# Patient Record
Sex: MALE | URBAN - METROPOLITAN AREA
[De-identification: names, ages, dates, MRNs, and addresses within clinical notes are randomized per-mention and may not be internally consistent; named-entity substitution may affect disease eponyms.]

---

## 2018-09-05 ENCOUNTER — IMPORTED ENCOUNTER (OUTPATIENT)
Dept: URBAN - METROPOLITAN AREA CLINIC 9 | Facility: CLINIC | Age: 58
End: 2018-09-05

## 2018-09-18 ENCOUNTER — IMPORTED ENCOUNTER (OUTPATIENT)
Dept: URBAN - METROPOLITAN AREA CLINIC 9 | Facility: CLINIC | Age: 58
End: 2018-09-18

## 2018-11-12 ENCOUNTER — IMPORTED ENCOUNTER (OUTPATIENT)
Dept: URBAN - METROPOLITAN AREA CLINIC 9 | Facility: CLINIC | Age: 58
End: 2018-11-12

## 2018-11-19 ENCOUNTER — IMPORTED ENCOUNTER (OUTPATIENT)
Dept: URBAN - METROPOLITAN AREA CLINIC 9 | Facility: CLINIC | Age: 58
End: 2018-11-19

## 2018-11-27 ENCOUNTER — IMPORTED ENCOUNTER (OUTPATIENT)
Dept: URBAN - METROPOLITAN AREA CLINIC 9 | Facility: CLINIC | Age: 58
End: 2018-11-27

## 2018-11-28 ENCOUNTER — IMPORTED ENCOUNTER (OUTPATIENT)
Dept: URBAN - METROPOLITAN AREA CLINIC 9 | Facility: CLINIC | Age: 58
End: 2018-11-28

## 2018-12-03 ENCOUNTER — IMPORTED ENCOUNTER (OUTPATIENT)
Dept: URBAN - METROPOLITAN AREA CLINIC 9 | Facility: CLINIC | Age: 58
End: 2018-12-03

## 2019-01-18 ENCOUNTER — IMPORTED ENCOUNTER (OUTPATIENT)
Dept: URBAN - METROPOLITAN AREA CLINIC 9 | Facility: CLINIC | Age: 59
End: 2019-01-18

## 2020-07-22 ENCOUNTER — INPATIENT (INPATIENT)
Facility: HOSPITAL | Age: 60
LOS: 0 days | Discharge: HOME | DRG: 65 | End: 2020-07-23
Attending: PSYCHIATRY & NEUROLOGY | Admitting: PSYCHIATRY & NEUROLOGY
Payer: MEDICAID

## 2020-07-22 VITALS
SYSTOLIC BLOOD PRESSURE: 224 MMHG | WEIGHT: 194.67 LBS | RESPIRATION RATE: 16 BRPM | OXYGEN SATURATION: 98 % | HEIGHT: 67 IN | HEART RATE: 49 BPM | DIASTOLIC BLOOD PRESSURE: 106 MMHG

## 2020-07-22 DIAGNOSIS — I63.9 CEREBRAL INFARCTION, UNSPECIFIED: ICD-10-CM

## 2020-07-22 DIAGNOSIS — R63.8 OTHER SYMPTOMS AND SIGNS CONCERNING FOOD AND FLUID INTAKE: ICD-10-CM

## 2020-07-22 DIAGNOSIS — J45.909 UNSPECIFIED ASTHMA, UNCOMPLICATED: ICD-10-CM

## 2020-07-22 DIAGNOSIS — I10 ESSENTIAL (PRIMARY) HYPERTENSION: ICD-10-CM
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
## 2020-07-22 LAB
ALBUMIN SERPL ELPH-MCNC: 4.5 G/DL — SIGNIFICANT CHANGE UP (ref 3.3–5)
ALP SERPL-CCNC: 62 U/L — SIGNIFICANT CHANGE UP (ref 40–120)
ALT FLD-CCNC: 13 U/L — SIGNIFICANT CHANGE UP (ref 10–45)
ANION GAP SERPL CALC-SCNC: 14 MMOL/L — SIGNIFICANT CHANGE UP (ref 5–17)
APPEARANCE UR: CLEAR — SIGNIFICANT CHANGE UP
AST SERPL-CCNC: 20 U/L — SIGNIFICANT CHANGE UP (ref 10–40)
BILIRUB SERPL-MCNC: 0.4 MG/DL — SIGNIFICANT CHANGE UP (ref 0.2–1.2)
BILIRUB UR-MCNC: NEGATIVE — SIGNIFICANT CHANGE UP
BUN SERPL-MCNC: 9 MG/DL — SIGNIFICANT CHANGE UP (ref 7–23)
CALCIUM SERPL-MCNC: 9.7 MG/DL — SIGNIFICANT CHANGE UP (ref 8.4–10.5)
CHLORIDE SERPL-SCNC: 101 MMOL/L — SIGNIFICANT CHANGE UP (ref 96–108)
CHOLEST SERPL-MCNC: 206 MG/DL — HIGH (ref 10–199)
CO2 SERPL-SCNC: 23 MMOL/L — SIGNIFICANT CHANGE UP (ref 22–31)
COLOR SPEC: YELLOW — SIGNIFICANT CHANGE UP
CREAT ?TM UR-MCNC: 134 MG/DL — SIGNIFICANT CHANGE UP
CREAT SERPL-MCNC: 1.28 MG/DL — SIGNIFICANT CHANGE UP (ref 0.5–1.3)
DIFF PNL FLD: NEGATIVE — SIGNIFICANT CHANGE UP
GLUCOSE SERPL-MCNC: 84 MG/DL — SIGNIFICANT CHANGE UP (ref 70–99)
GLUCOSE UR QL: NEGATIVE — SIGNIFICANT CHANGE UP
HCT VFR BLD CALC: 41.1 % — SIGNIFICANT CHANGE UP (ref 39–50)
HDLC SERPL-MCNC: 45 MG/DL — SIGNIFICANT CHANGE UP
HGB BLD-MCNC: 12.7 G/DL — LOW (ref 13–17)
KETONES UR-MCNC: NEGATIVE — SIGNIFICANT CHANGE UP
LEUKOCYTE ESTERASE UR-ACNC: NEGATIVE — SIGNIFICANT CHANGE UP
LIPID PNL WITH DIRECT LDL SERPL: 141 MG/DL — HIGH
MAGNESIUM SERPL-MCNC: 2.3 MG/DL — SIGNIFICANT CHANGE UP (ref 1.6–2.6)
MCHC RBC-ENTMCNC: 22.4 PG — LOW (ref 27–34)
MCHC RBC-ENTMCNC: 30.9 GM/DL — LOW (ref 32–36)
MCV RBC AUTO: 72.6 FL — LOW (ref 80–100)
NITRITE UR-MCNC: NEGATIVE — SIGNIFICANT CHANGE UP
NRBC # BLD: 0 /100 WBCS — SIGNIFICANT CHANGE UP (ref 0–0)
PH UR: 7 — SIGNIFICANT CHANGE UP (ref 5–8)
PHOSPHATE SERPL-MCNC: 2.8 MG/DL — SIGNIFICANT CHANGE UP (ref 2.5–4.5)
PLATELET # BLD AUTO: 292 K/UL — SIGNIFICANT CHANGE UP (ref 150–400)
POTASSIUM SERPL-MCNC: 4 MMOL/L — SIGNIFICANT CHANGE UP (ref 3.5–5.3)
POTASSIUM SERPL-SCNC: 4 MMOL/L — SIGNIFICANT CHANGE UP (ref 3.5–5.3)
PROT ?TM UR-MCNC: 16 MG/DL — HIGH (ref 0–12)
PROT SERPL-MCNC: 7.6 G/DL — SIGNIFICANT CHANGE UP (ref 6–8.3)
PROT UR-MCNC: NEGATIVE MG/DL — SIGNIFICANT CHANGE UP
PROT/CREAT UR-RTO: 0.1 RATIO — SIGNIFICANT CHANGE UP (ref 0–0.2)
RBC # BLD: 5.66 M/UL — SIGNIFICANT CHANGE UP (ref 4.2–5.8)
RBC # FLD: 16.6 % — HIGH (ref 10.3–14.5)
SODIUM SERPL-SCNC: 138 MMOL/L — SIGNIFICANT CHANGE UP (ref 135–145)
SP GR SPEC: 1.01 — SIGNIFICANT CHANGE UP (ref 1–1.03)
TOTAL CHOLESTEROL/HDL RATIO MEASUREMENT: 4.6 RATIO — SIGNIFICANT CHANGE UP (ref 3.4–9.6)
TRIGL SERPL-MCNC: 101 MG/DL — SIGNIFICANT CHANGE UP (ref 10–149)
UROBILINOGEN FLD QL: 0.2 E.U./DL — SIGNIFICANT CHANGE UP
WBC # BLD: 9.75 K/UL — SIGNIFICANT CHANGE UP (ref 3.8–10.5)
WBC # FLD AUTO: 9.75 K/UL — SIGNIFICANT CHANGE UP (ref 3.8–10.5)

## 2020-07-22 PROCEDURE — 99223 1ST HOSP IP/OBS HIGH 75: CPT

## 2020-07-22 RX ORDER — SODIUM CHLORIDE 9 MG/ML
1000 INJECTION INTRAMUSCULAR; INTRAVENOUS; SUBCUTANEOUS ONCE
Refills: 0 | Status: COMPLETED | OUTPATIENT
Start: 2020-07-22 | End: 2020-07-22

## 2020-07-22 RX ORDER — ASPIRIN/CALCIUM CARB/MAGNESIUM 324 MG
81 TABLET ORAL DAILY
Refills: 0 | Status: DISCONTINUED | OUTPATIENT
Start: 2020-07-23 | End: 2020-07-23

## 2020-07-22 RX ORDER — ENOXAPARIN SODIUM 100 MG/ML
40 INJECTION SUBCUTANEOUS AT BEDTIME
Refills: 0 | Status: DISCONTINUED | OUTPATIENT
Start: 2020-07-22 | End: 2020-07-23

## 2020-07-22 RX ORDER — ATORVASTATIN CALCIUM 80 MG/1
80 TABLET, FILM COATED ORAL AT BEDTIME
Refills: 0 | Status: DISCONTINUED | OUTPATIENT
Start: 2020-07-22 | End: 2020-07-23

## 2020-07-22 RX ORDER — HYDRALAZINE HCL 50 MG
5 TABLET ORAL EVERY 4 HOURS
Refills: 0 | Status: DISCONTINUED | OUTPATIENT
Start: 2020-07-22 | End: 2020-07-23

## 2020-07-22 RX ORDER — CLOPIDOGREL BISULFATE 75 MG/1
75 TABLET, FILM COATED ORAL DAILY
Refills: 0 | Status: DISCONTINUED | OUTPATIENT
Start: 2020-07-22 | End: 2020-07-23

## 2020-07-22 RX ADMIN — ENOXAPARIN SODIUM 40 MILLIGRAM(S): 100 INJECTION SUBCUTANEOUS at 21:05

## 2020-07-22 RX ADMIN — SODIUM CHLORIDE 1000 MILLILITER(S): 9 INJECTION INTRAMUSCULAR; INTRAVENOUS; SUBCUTANEOUS at 21:05

## 2020-07-22 RX ADMIN — CLOPIDOGREL BISULFATE 75 MILLIGRAM(S): 75 TABLET, FILM COATED ORAL at 16:19

## 2020-07-22 RX ADMIN — Medication 5 MILLIGRAM(S): at 15:25

## 2020-07-22 RX ADMIN — ATORVASTATIN CALCIUM 80 MILLIGRAM(S): 80 TABLET, FILM COATED ORAL at 21:06

## 2020-07-22 NOTE — H&P ADULT - PROBLEM SELECTOR PLAN 1
Patient presented to [place] [facility] with nausea, vomiting x 3, dizziness for 2-3 hours, non specific diffuse abdominal discomfort, blurry vision, diffused dull headaches, and left side body weakness.  Imaging in [place]:  -CT w contrast significant for suspected obstruction of the posterior circulation and subsequent subacute bilateral cerebellar infarction.  -CT Head W/O contrast: No evidence of acute intracranial hemorrhage. Concerning for BL subacute cerebral infarction.  ASA 325mg and Amlodipine 2.5 mg was given and BP goal was 150-180systolic ,  diastolic at outside hospital.  -Hydralazine 5mg IV push for SBP>180  -Continue aspirin 81mg   -Start Plavix 75 mg daily  - Lipitor 80 mg daily  - Passed bedside Dysphagia  - Continue neuro check q4h  - f/u LISA result for 7/23  - No need for more imaging

## 2020-07-22 NOTE — H&P ADULT - NSHPLABSRESULTS_GEN_ALL_CORE
LABS:                   I/O SUMMARY:    07-22-20 @ 07:01  -  07-22-20 @ 17:19  --------------------------------------------------------  IN: 0 mL / OUT: 500 mL / NET: -500 mL

## 2020-07-22 NOTE — H&P ADULT - HISTORY OF PRESENT ILLNESS
HPI: 58 Yo poor historian male with significant HTN (unknown med compliance), unsure Hx of bronchial Asthma  and suspected baseline decreased cognitive disorder presented to the [facility] with persistent neurological symptoms that started a few hours before visit to the [place] [facility]. Upon arrival to the ED, patient reported nausea and dizziness for 2-3 hours, non specific diffuse abdominal discomfort and, diffused dull headaches. Patient denied blurry vision, confusion, chills, fever, cough or diaphoresis. Upon arrival to the ED, patient was noted agitated with slurred speech and left side body weakness. CT w contrast significant for suspected obstruction of the posterior circulation and subsequent subacute bilateral cerebellar infarction. Following Neuro recommendation, Asprini 325mg and Amlodipin 2.5 mg was given and BP goal was 150-180systolic ,  diastolic.    [place] ED labs: WBC 12.80, Hb 13.8. HTC 42, MCV 75.7, MCH 24.9, RDW 15.1, , Neutr 80.9, Lymph 10.1, Creatinine 1.49, Glucose 101, and Troponin I: <0.015.   Pt Blood Type: B Positive. T&S negative.   CT Head W/O contrast: No evidence of acute intracranial hemorrhage. Concerning for BL subacute cerebral infarction.  CXR done due to chest pain complaint. Unremarkable.                 Denies fevers, chills, cough, chest pain, dyspnea, nausea, headache, diarrhea, sick contactschange in urinary or bowel habits  In the ED:    - VS: Tmax: , HR:  , BP:  , RR: , O2: %     - Pertinent Labs:     - Imaging: CXR: CT: US: Cath: EKG:     - Treatment/interventions:     PMHx:   PSHx:  Meds: See med rec  Allergies:  Social: see below HPI: 60 Yo poor historian male with significant HTN (unknown med compliance), hx of Asthma and suspected baseline decreased cognitive disorder presented to the [facility] with persistent neurological symptoms that started a few hours before visit to the [place] ED (7/22). Patient reports his headache and blurry vision was started Friday (7/17), he was prescribed with HTN medication(unknown name) on Monday (7/20) and on Tuesday (7/21) he had nausea, vomiting x 3, dizziness for 2-3 hours, non specific diffuse abdominal discomfort, blurry vision, diffused dull headaches, and left side body weakness. He called 911 and he was transferred to [place] ED.  Patient denied chills, fever, cough or diaphoresis.     Upon arrival to the ED, patient was noted agitated with slurred speech and left side body weakness. CT w contrast significant for suspected obstruction of the posterior circulation and subsequent subacute bilateral cerebellar infarction. Following Neuro recommendation, ASA 325mg and Amlodipine 2.5 mg was given and BP goal was 150-180systolic ,  diastolic.    [place] ED labs: WBC 12.80, Hb 13.8. HTC 42, MCV 75.7, MCH 24.9, RDW 15.1, , Neutr 80.9, Lymph 10.1, Creatinine 1.49, Glucose 101, and Troponin I: <0.015.   Pt Blood Type: B Positive. T&S negative.   CT Head W/O contrast: No evidence of acute intracranial hemorrhage. Concerning for BL subacute cerebral infarction.  CXR done due to chest pain complaint, Unremarkable. We don't have EKG report from [place] ED.  He was transferred to [facility] for more neurology/stroke evaluation.

## 2020-07-22 NOTE — H&P ADULT - ATTENDING COMMENTS
Patient seen today with stroke team NEVIN Yepez and resident Dr. Gibbs.     I was physically present for the key portions of the evaluation and management (E/M) service provided.  I agree with the above history, physical, and plan with the following additions/modifications    New onset 5 days ago of ataxia, gait instability, dysarthria and dysphagia, worsened over that time, now came to medical attention today.  B/l cerebellar strokes, vasculature appears intact, read as normal on OSH imaging.   Target mild hypernatremia though at this time strokes are well demarcated without edema, risk for malignant edema is relatively low.  Will initiate full stroke workup, and PT/OT/speech therapy.     Maciej Gaines MD  Attending Neurointensivist

## 2020-07-22 NOTE — H&P ADULT - ASSESSMENT
60 Yo poor historian male with significant HTN (unknown med compliance), hx of Asthma and suspected baseline decreased cognitive disorder presented to the [facility] with persistent neurological symptoms that started a few hours before visit to the [place] ED (7/22). He was transferred to [facility] for more neurology/stroke evaluation.

## 2020-07-22 NOTE — H&P ADULT - NSHPPHYSICALEXAM_GEN_ALL_CORE
PHYSICAL EXAM:  GENERAL: Pt lying in bed comfortably in NAD  HEAD:  Atraumatic   EYES: EOMI, PERRL, conjunctiva and sclera clear  ENT: Moist mucous membranes  NECK: Supple, No JVD  CHEST/LUNG: Clear to auscultation bilaterally; No rales, rhonchi, wheezing or rubs. Unlabored respirations  HEART: Regular rate and rhythm; No murmurs, rubs, or gallops  ABDOMEN: Bowel sounds present; Soft, Nontender, Nondistended. No guarding or rigidity    EXTREMITIES:  2+ Peripheral Pulses, brisk capillary refill. No clubbing, cyanosis, or edema  NERVOUS SYSTEM:  Alert & Oriented X3, speech unclear (dysarthric). CN 2-12 intact.  Facial movements symmetrical, no facial droop, tongue protrusion midline. Left side strength 4/5. Right side strength  5/5 upper and lower extremities. BL Sensation intact.   MSK: Limited left arm ROM.   All other extremities moves spontaneously. PHYSICAL EXAM:  GENERAL: Pt lying in bed comfortably in NAD  HEAD:  Atraumatic   EYES: EOMI, PERRL, conjunctiva and sclera clear  ENT: Moist mucous membranes  NECK: Supple, No JVD  CHEST/LUNG: Clear to auscultation bilaterally; No rales, rhonchi, wheezing or rubs. Unlabored respirations  HEART: Regular rate and rhythm; No murmurs, rubs, or gallops  ABDOMEN: Bowel sounds present; Soft, Nontender, Nondistended. No guarding or rigidity    EXTREMITIES:  2+ Peripheral Pulses, brisk capillary refill. No clubbing, cyanosis, or edema  NERVOUS SYSTEM:  Alert & Oriented X3, speech unclear (dysarthric). CN 2-12 intact.  Facial movements symmetrical, no facial droop, tongue protrusion midline. + dysarthria.  Pt hears more on L side then R. Left side strength 4/5. Right side strength  5/5 upper and lower extremities. BL Sensation intact.   MSK: Limited left arm ROM.   All other extremities moves spontaneously.

## 2020-07-22 NOTE — H&P ADULT - PROBLEM SELECTOR PLAN 5
F: none. Bolus as needed to maintain BP  E: Replete PRN  N: DASH/Diet     Prophylaxis: Lovenox 40mg sq, scd

## 2020-07-22 NOTE — H&P ADULT - NSHPREVIEWOFSYSTEMS_GEN_ALL_CORE
REVIEW OF SYSTEMS:    CONSTITUTIONAL: No weakness, fevers or chills  EYES/ENT: No visual changes;  No vertigo or throat pain   NECK: No pain or stiffness  RESPIRATORY: No cough, wheezing, hemoptysis; No shortness of breath  CARDIOVASCULAR: No chest pain or palpitations  GASTROINTESTINAL: No abdominal or epigastric pain. No nausea, vomiting, or hematemesis; No diarrhea or constipation. No melena or hematochezia.  GENITOURINARY: No dysuria, frequency or hematuria  SKIN: No itching, rashes  NEUROLOGICAL: Denies headache.
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
## 2020-07-23 VITALS — SYSTOLIC BLOOD PRESSURE: 164 MMHG | DIASTOLIC BLOOD PRESSURE: 77 MMHG

## 2020-07-23 LAB
ANION GAP SERPL CALC-SCNC: 11 MMOL/L — SIGNIFICANT CHANGE UP (ref 5–17)
ANION GAP SERPL CALC-SCNC: 13 MMOL/L — SIGNIFICANT CHANGE UP (ref 5–17)
BUN SERPL-MCNC: 12 MG/DL — SIGNIFICANT CHANGE UP (ref 7–23)
BUN SERPL-MCNC: 14 MG/DL — SIGNIFICANT CHANGE UP (ref 7–23)
CALCIUM SERPL-MCNC: 9.3 MG/DL — SIGNIFICANT CHANGE UP (ref 8.4–10.5)
CALCIUM SERPL-MCNC: 9.5 MG/DL — SIGNIFICANT CHANGE UP (ref 8.4–10.5)
CHLORIDE SERPL-SCNC: 101 MMOL/L — SIGNIFICANT CHANGE UP (ref 96–108)
CHLORIDE SERPL-SCNC: 105 MMOL/L — SIGNIFICANT CHANGE UP (ref 96–108)
CO2 SERPL-SCNC: 21 MMOL/L — LOW (ref 22–31)
CO2 SERPL-SCNC: 25 MMOL/L — SIGNIFICANT CHANGE UP (ref 22–31)
CREAT SERPL-MCNC: 1.37 MG/DL — HIGH (ref 0.5–1.3)
CREAT SERPL-MCNC: 1.55 MG/DL — HIGH (ref 0.5–1.3)
GLUCOSE SERPL-MCNC: 100 MG/DL — HIGH (ref 70–99)
GLUCOSE SERPL-MCNC: 114 MG/DL — HIGH (ref 70–99)
HCT VFR BLD CALC: 41.5 % — SIGNIFICANT CHANGE UP (ref 39–50)
HGB BLD-MCNC: 13 G/DL — SIGNIFICANT CHANGE UP (ref 13–17)
MCHC RBC-ENTMCNC: 22.7 PG — LOW (ref 27–34)
MCHC RBC-ENTMCNC: 31.3 GM/DL — LOW (ref 32–36)
MCV RBC AUTO: 72.4 FL — LOW (ref 80–100)
NRBC # BLD: 0 /100 WBCS — SIGNIFICANT CHANGE UP (ref 0–0)
PLATELET # BLD AUTO: 278 K/UL — SIGNIFICANT CHANGE UP (ref 150–400)
POTASSIUM SERPL-MCNC: 4.1 MMOL/L — SIGNIFICANT CHANGE UP (ref 3.5–5.3)
POTASSIUM SERPL-MCNC: 4.4 MMOL/L — SIGNIFICANT CHANGE UP (ref 3.5–5.3)
POTASSIUM SERPL-SCNC: 4.1 MMOL/L — SIGNIFICANT CHANGE UP (ref 3.5–5.3)
POTASSIUM SERPL-SCNC: 4.4 MMOL/L — SIGNIFICANT CHANGE UP (ref 3.5–5.3)
RBC # BLD: 5.73 M/UL — SIGNIFICANT CHANGE UP (ref 4.2–5.8)
RBC # FLD: 17 % — HIGH (ref 10.3–14.5)
SARS-COV-2 RNA SPEC QL NAA+PROBE: SIGNIFICANT CHANGE UP
SODIUM SERPL-SCNC: 135 MMOL/L — SIGNIFICANT CHANGE UP (ref 135–145)
SODIUM SERPL-SCNC: 141 MMOL/L — SIGNIFICANT CHANGE UP (ref 135–145)
WBC # BLD: 7.92 K/UL — SIGNIFICANT CHANGE UP (ref 3.8–10.5)
WBC # FLD AUTO: 7.92 K/UL — SIGNIFICANT CHANGE UP (ref 3.8–10.5)

## 2020-07-23 PROCEDURE — 84100 ASSAY OF PHOSPHORUS: CPT

## 2020-07-23 PROCEDURE — 97162 PT EVAL MOD COMPLEX 30 MIN: CPT

## 2020-07-23 PROCEDURE — 99233 SBSQ HOSP IP/OBS HIGH 50: CPT

## 2020-07-23 PROCEDURE — 82570 ASSAY OF URINE CREATININE: CPT

## 2020-07-23 PROCEDURE — 83735 ASSAY OF MAGNESIUM: CPT

## 2020-07-23 PROCEDURE — 85027 COMPLETE CBC AUTOMATED: CPT

## 2020-07-23 PROCEDURE — 87635 SARS-COV-2 COVID-19 AMP PRB: CPT

## 2020-07-23 PROCEDURE — 99254 IP/OBS CNSLTJ NEW/EST MOD 60: CPT

## 2020-07-23 PROCEDURE — 80053 COMPREHEN METABOLIC PANEL: CPT

## 2020-07-23 PROCEDURE — 80048 BASIC METABOLIC PNL TOTAL CA: CPT

## 2020-07-23 PROCEDURE — 99223 1ST HOSP IP/OBS HIGH 75: CPT

## 2020-07-23 PROCEDURE — 81003 URINALYSIS AUTO W/O SCOPE: CPT

## 2020-07-23 PROCEDURE — 80061 LIPID PANEL: CPT

## 2020-07-23 PROCEDURE — 36415 COLL VENOUS BLD VENIPUNCTURE: CPT

## 2020-07-23 PROCEDURE — 84156 ASSAY OF PROTEIN URINE: CPT

## 2020-07-23 RX ORDER — AMLODIPINE BESYLATE 2.5 MG/1
5 TABLET ORAL DAILY
Refills: 0 | Status: DISCONTINUED | OUTPATIENT
Start: 2020-07-23 | End: 2020-07-23

## 2020-07-23 RX ORDER — ATORVASTATIN CALCIUM 80 MG/1
1 TABLET, FILM COATED ORAL
Qty: 30 | Refills: 0
Start: 2020-07-23 | End: 2020-08-21

## 2020-07-23 RX ORDER — AMLODIPINE BESYLATE 2.5 MG/1
1 TABLET ORAL
Qty: 30 | Refills: 0
Start: 2020-07-23 | End: 2020-08-21

## 2020-07-23 RX ORDER — ASPIRIN/CALCIUM CARB/MAGNESIUM 324 MG
1 TABLET ORAL
Qty: 30 | Refills: 0
Start: 2020-07-23 | End: 2020-08-21

## 2020-07-23 RX ORDER — AMLODIPINE BESYLATE 2.5 MG/1
1 TABLET ORAL
Qty: 0 | Refills: 0 | DISCHARGE
Start: 2020-07-23

## 2020-07-23 RX ORDER — CLOPIDOGREL BISULFATE 75 MG/1
1 TABLET, FILM COATED ORAL
Qty: 30 | Refills: 0
Start: 2020-07-23 | End: 2020-08-21

## 2020-07-23 RX ORDER — SODIUM CHLORIDE 9 MG/ML
1000 INJECTION INTRAMUSCULAR; INTRAVENOUS; SUBCUTANEOUS ONCE
Refills: 0 | Status: COMPLETED | OUTPATIENT
Start: 2020-07-23 | End: 2020-07-23

## 2020-07-23 RX ADMIN — Medication 81 MILLIGRAM(S): at 11:33

## 2020-07-23 RX ADMIN — AMLODIPINE BESYLATE 5 MILLIGRAM(S): 2.5 TABLET ORAL at 16:10

## 2020-07-23 RX ADMIN — CLOPIDOGREL BISULFATE 75 MILLIGRAM(S): 75 TABLET, FILM COATED ORAL at 11:33

## 2020-07-23 RX ADMIN — Medication 5 MILLIGRAM(S): at 16:10

## 2020-07-23 NOTE — DISCHARGE NOTE NURSING/CASE MANAGEMENT/SOCIAL WORK - NSDCFUADDAPPT_GEN_ALL_CORE_FT
Please schedule an appointment with Dr. Dane Tadeo or any Neurologist at [facility] upon discharge. It is imperative if you close follow up for your extremely high blood pressure and recent stroke.     [phone]

## 2020-07-23 NOTE — DISCHARGE NOTE PROVIDER - NSDCQMSTROKERISK_NEU_ALL_CORE
High blood pressure/History of a stroke or TIA High blood pressure History of a stroke or TIA/High blood pressure

## 2020-07-23 NOTE — DISCHARGE NOTE PROVIDER - NSDCCPCAREPLAN_GEN_ALL_CORE_FT
PRINCIPAL DISCHARGE DIAGNOSIS  Diagnosis: Cerebrovascular accident (CVA)  Assessment and Plan of Treatment:         SECONDARY DISCHARGE DIAGNOSES  Diagnosis: HTN (hypertension)  Assessment and Plan of Treatment:     Diagnosis: Asthma  Assessment and Plan of Treatment:     Diagnosis: Nutrition, metabolism, and development symptoms  Assessment and Plan of Treatment: Nutrition, metabolism, and development symptoms PRINCIPAL DISCHARGE DIAGNOSIS  Diagnosis: Cerebrovascular accident (CVA)  Assessment and Plan of Treatment: You were noted to have a stroke, also known as a cerebrovascular accident (CVA). This was found based on your symptom profile, and findings noted on computed tomographical imaging (CT imaging) of your brain. Please continue to take aspirin and clopidogrel ___ as prescribed. Please follow-up with your primary care physician, and with your neurologist,  ____. Please continue to work with physical therapy if needed. Symptom improvement varies greatly, varying from minimal improvement to even possibly returning back to baseline with rehabilitation therapy. Should you start to experience symptoms such as but not limited to: changes in vision, changes in hearing, severe weakness/dizziness, fainting spells, or difficulties moving your eyelids or mouth, please return to the emergency department immediately for interval evaluation.  Please continue to take aspirin 81 mg daily, lipitor 80 mg daily        SECONDARY DISCHARGE DIAGNOSES  Diagnosis: HTN (hypertension)  Assessment and Plan of Treatment:     Diagnosis: Asthma  Assessment and Plan of Treatment:     Diagnosis: Nutrition, metabolism, and development symptoms  Assessment and Plan of Treatment: Nutrition, metabolism, and development symptoms PRINCIPAL DISCHARGE DIAGNOSIS  Diagnosis: Cerebrovascular accident (CVA)  Assessment and Plan of Treatment: You were noted to have a stroke, also known as a cerebrovascular accident (CVA). This was found based on your symptom profile, and findings noted on computed tomographical imaging (CT imaging) of your brain. Please continue to take aspirin 81mg and clopidogrel 75 mg as prescribed. Please follow-up with your primary care physician, and with your neurologist, Dr. Dr. Dane Tadeo Neurologist in [place]  [phone]. Please continue to work with physical therapy if needed. Symptom improvement varies greatly, varying from minimal improvement to even possibly returning back to baseline with rehabilitation therapy. Should you start to experience symptoms such as but not limited to: changes in vision, changes in hearing, severe weakness/dizziness, fainting spells, or difficulties moving your eyelids or mouth, please return to the emergency department immediately for interval evaluation.  Please continue to take aspirin 81 mg daily, lipitor 80 mg daily        SECONDARY DISCHARGE DIAGNOSES  Diagnosis: HTN (hypertension)  Assessment and Plan of Treatment: Hypertension is the medical term for high blood pressure. Blood pressure refers to the pressure that blood applies to the inner walls of the arteries. Arteries carry blood from the heart to other organs and parts of the body. Untreated high blood pressure increases the strain on the heart and arteries, eventually causing organ damage. High blood pressure increases the risk of heart failure, heart attack (myocardial infarction), stroke, and kidney failure. High blood pressure does not usually cause any symptoms. Treatment of hypertension usually begins with lifestyle changes. Making these lifestyle changes involves little or no risk. Recommended changes often include reducing the amount of salt in your diet, losing weight if you are overweight or obese, avoiding drinking too much alcohol, stopping smoking and exercising at least 30 minutes per day most days of the week. If you are prescribed medication for your hypertension it is important to take these as prescribed to prevent the possible complications of uncontrolled hypertension.  Please continue to take Amlodipine 5mg daily      Diagnosis: Asthma  Assessment and Plan of Treatment: Asthma is a condition that can make it hard to breathe. Asthma does not always cause symptoms. But when symptoms occur, they can be scary. Asthma attacks happen when the airways in the lungs become narrow and inflamed. Asthma symptoms can include wheezing or noisy breathing, coughing, a tight feeling in the chest and shortness of breath. You can help prevent your asthma symptoms by staying away from things that may cause your symptoms or make them worse. Some common triggers include dust, mold, pets, pollen, cigarette smoke, getting sick with a cold or flu and stress. It is important that you take your asthma medications as they are prescribe to prevent further exacerbations and help with symptoms during an asthma attack. You should see your doctor if you have an asthma attack and the symptoms do not improve or get worse after using a quick-relief medicine. If the symptoms are severe, call for an ambulance. PRINCIPAL DISCHARGE DIAGNOSIS  Diagnosis: Cerebrovascular accident (CVA)  Assessment and Plan of Treatment: You were noted to have a stroke, also known as a cerebrovascular accident (CVA). This was found based on your symptom profile, and findings noted on computed tomographical imaging (CT imaging) of your brain. Please continue to take aspirin 81mg and clopidogrel 75 mg as prescribed. Please follow-up with your primary care physician, and with your neurologist, Dr. Dr. Dane Tadeo Neurologist in [place]  [phone]. Please continue to work with physical therapy if needed. Symptom improvement varies greatly, varying from minimal improvement to even possibly returning back to baseline with rehabilitation therapy. Should you start to experience symptoms such as but not limited to: changes in vision, changes in hearing, severe weakness/dizziness, fainting spells, or difficulties moving your eyelids or mouth, please return to the emergency department immediately for interval evaluation.  Please continue to take aspirin 81 mg daily, lipitor 80 mg daily        SECONDARY DISCHARGE DIAGNOSES  Diagnosis: Acute kidney injury  Assessment and Plan of Treatment: Although we do not know your baseline kidney function, tests showed that your creatinine was elevated which is a sign of kidney injury. It is important to take your blood pressure medications as prescribed and drink plenty of clear liquids.  You should have this kidney function tests repeated as an outpatient.    Diagnosis: Asthma  Assessment and Plan of Treatment: Asthma is a condition that can make it hard to breathe. Asthma does not always cause symptoms. But when symptoms occur, they can be scary. Asthma attacks happen when the airways in the lungs become narrow and inflamed. Asthma symptoms can include wheezing or noisy breathing, coughing, a tight feeling in the chest and shortness of breath. You can help prevent your asthma symptoms by staying away from things that may cause your symptoms or make them worse. Some common triggers include dust, mold, pets, pollen, cigarette smoke, getting sick with a cold or flu and stress. It is important that you take your asthma medications as they are prescribe to prevent further exacerbations and help with symptoms during an asthma attack. You should see your doctor if you have an asthma attack and the symptoms do not improve or get worse after using a quick-relief medicine. If the symptoms are severe, call for an ambulance.      Diagnosis: HTN (hypertension)  Assessment and Plan of Treatment: Hypertension is the medical term for high blood pressure. Blood pressure refers to the pressure that blood applies to the inner walls of the arteries. Arteries carry blood from the heart to other organs and parts of the body. Untreated high blood pressure increases the strain on the heart and arteries, eventually causing organ damage. High blood pressure increases the risk of heart failure, heart attack (myocardial infarction), stroke, and kidney failure. High blood pressure does not usually cause any symptoms. Treatment of hypertension usually begins with lifestyle changes. Making these lifestyle changes involves little or no risk. Recommended changes often include reducing the amount of salt in your diet, losing weight if you are overweight or obese, avoiding drinking too much alcohol, stopping smoking and exercising at least 30 minutes per day most days of the week. If you are prescribed medication for your hypertension it is important to take these as prescribed to prevent the possible complications of uncontrolled hypertension.  Please continue to take Amlodipine 5mg daily

## 2020-07-23 NOTE — PROGRESS NOTE ADULT - ASSESSMENT
60 Yo poor historian male with significant HTN (unknown med compliance), hx of Asthma and suspected baseline decreased cognitive disorder presented to the [facility] with persistent neurological symptoms that started a few hours before visit to the [place] (7/22). He was transferred to [facility] for more neurology/stroke evaluation.

## 2020-07-23 NOTE — PHYSICAL THERAPY INITIAL EVALUATION ADULT - PERTINENT HX OF CURRENT PROBLEM, REHAB EVAL
58 Yo poor historian male with significant HTN (unknown med compliance), hx of Asthma and suspected baseline decreased cognitive disorder presented to the [facility] with persistent neurological symptoms that started a few hours before visit to the [facility] (7/22). He was transferred to [facility] for more neurology/stroke evaluation

## 2020-07-23 NOTE — PROGRESS NOTE ADULT - ATTENDING COMMENTS
Attending addendum:    Patient seen today with stroke resident and PA team.  I was physically present for the key portions of the evaluation and management (E/M) service provided.  I agree with the above history, physical, and plan with the following additions/modifications     Patient with prominent but stable ataxia and dysarthria.  Stroke mechanism highly likely 2/2 intracranial atherosclerosis in posterior circulation.   Ambulated on my exam and seen by PT. Continue DAPT for 3 weeks and then de-escalate to ASA only.  Pending echo to rule out cardioembolic source, if negative can follow up with outpatient neurology.    Maciej Gaines MD  Attending Neurointensivist

## 2020-07-23 NOTE — PHYSICAL THERAPY INITIAL EVALUATION ADULT - ADDITIONAL COMMENTS
Limited social hx 2/2 pt mildly agitated. pt states that he lives w/ his wife in an apartment. Denies use of DME for ambulation. Denies hx of recent falls

## 2020-07-23 NOTE — DISCHARGE NOTE PROVIDER - NSDCMRMEDTOKEN_GEN_ALL_CORE_FT
amLODIPine 5 mg oral tablet: 1 tab(s) orally once a day MDD:5 mg daily  aspirin 81 mg oral tablet, chewable: 1 tab(s) orally once a day MDD:81 mg daily  atorvastatin 80 mg oral tablet: 1 tab(s) orally once a day (at bedtime) MDD:80 mg daily  clopidogrel 75 mg oral tablet: 1 tab(s) orally once a day MDD:75 mg daily amLODIPine 5 mg oral tablet: 1 tab(s) orally once a day MDD:5 mg daily  amLODIPine 5 mg oral tablet: 1 tab(s) orally once a day  aspirin 81 mg oral tablet, chewable: 1 tab(s) orally once a day MDD:81 mg daily  atorvastatin 80 mg oral tablet: 1 tab(s) orally once a day (at bedtime) MDD:80 mg daily  clopidogrel 75 mg oral tablet: 1 tab(s) orally once a day MDD:75 mg daily

## 2020-07-23 NOTE — DISCHARGE NOTE PROVIDER - NSDCACTIVITY_GEN_ALL_CORE
No heavy lifting/straining/Return to Work/School allowed/Do not make important decisions/Do not drive or operate machinery

## 2020-07-23 NOTE — DISCHARGE NOTE PROVIDER - HOSPITAL COURSE
60 Yo poor historian male with significant HTN (unknown med compliance), hx of Asthma and suspected baseline decreased cognitive disorder presented to the [facility] with persistent neurological symptoms that started a few hours before visit to the [place] ED (7/22). Patient reports his headache and blurry vision was started Friday (7/17), he was prescribed with HTN medication(unknown name) on Monday (7/20) and on Tuesday (7/21) he had nausea, vomiting x 3, dizziness for 2-3 hours, non specific diffuse abdominal discomfort, blurry vision, diffused dull headaches, and left side body weakness. He called 911 and he was transferred to [place] ED.  Patient denied chills, fever, cough or diaphoresis.         Upon arrival to the ED, patient was noted agitated with slurred speech and left side body weakness. CT w contrast significant for suspected obstruction of the posterior circulation and subsequent subacute bilateral cerebellar infarction. Following Neuro recommendation, ASA 325mg and Amlodipine 2.5 mg was given and BP goal was 150-180systolic ,  diastolic.      [place] ED labs: WBC 12.80, Hb 13.8. HTC 42, MCV 75.7, MCH 24.9, RDW 15.1, , Neutr 80.9, Lymph 10.1, Creatinine 1.49, Glucose 101, and Troponin I: <0.015. Pt Blood Type: B Positive. T&S negative.     CXR done due to chest pain complaint, Unremarkable. We don't have EKG report from [place] ED.    Pt also endorsing tinnitus since Friday (5 days ago, at onset of symptoms)                            Patient is __ yo M/F with past medical history of _____    Presented with _____, found to have _____    Problem List/Main Diagnoses (system-based):     Inpatient treatment course:     New medications:     Labs to be followed outpatient:     Exam to be followed outpatient: 58 Yo poor historian male with significant HTN (unknown med compliance), hx of Asthma and suspected baseline decreased cognitive disorder presented to the [facility] with persistent neurological symptoms that started a few hours before visit to the [place] ED (7/22). Patient reports his headache and blurry vision was started Friday (7/17), he was prescribed with HTN medication(unknown name) on Monday (7/20) and on Tuesday (7/21) he had nausea, vomiting x 3, dizziness for 2-3 hours, non specific diffuse abdominal discomfort, blurry vision, diffused dull headaches, and left side body weakness. He called 911 and he was transferred to [place] ED.  Patient denied chills, fever, cough or diaphoresis. Pt also endorsing tinnitus since Friday (5 days ago, at onset of symptoms)    Upon arrival to the [place] ED, patient was noted agitated with slurred speech and left side body weakness. CT w contrast significant for suspected obstruction of the posterior circulation and subsequent subacute bilateral cerebellar infarction. Following Neuro recommendation, ASA 325mg and Amlodipine 2.5 mg was given and BP goal was 150-180systolic ,  diastolic.         Patient was transferred to [facility] on 7/22/2020 for more evaluation. In [facility], 7/22/2020 he had significant + pt with significant dysmetria on Left finger-to-nose and Left heel-to-shin    and + dysarthria.                Problems, Plan:    1.CVA (cerebral vascular accident).      Patient presented to [place] [facility] with nausea, vomiting x 3, dizziness for 2-3 hours, non specific diffuse abdominal discomfort, blurry vision, diffused dull headaches, and left side body weakness.    Imaging in [place]:    -CT w contrast significant for suspected obstruction of the posterior circulation and subsequent subacute bilateral cerebellar infarction.    -CT Head W/O contrast: No evidence of acute intracranial hemorrhage. Concerning for BL subacute cerebral infarction.    ASA 325mg and Amlodipine 2.5 mg was given and BP goal was 150-180systolic ,  diastolic at outside hospital.    Meds at [facility]:    -Hydralazine 5mg IV push for SBP>180    -Continue aspirin 81mg     -Start Plavix 75 mg daily    - Lipitor 80 mg daily    - Passed bedside Dysphagia    - Continue neuro check q4h    - LISA done 7/23        2.HTN (hypertension).     -Permissive HTN, Systolic 140-180    -Cr:1.55 (7/23),     - Amlodipine 5mg oral started        3. R/O Acute kidney injury.      creatinine : 1.49, 1.55, However unknown baseline. Daily BMP monitored.        4. Asthma.     - Pt reports hx of asthma.     - Dounebs PRN for wheezing and SOB.         5. Nutrition, metabolism, and development symptoms.    F: none. Bolus as needed to maintain BP    E: Replete PRN    N: DASH/Diet     D: Lovenox 40mg sq, scd.                                            Inpatient treatment course:     New medications:     Labs to be followed outpatient:     Exam to be followed outpatient: 60 Yo poor historian male with significant HTN (unknown med compliance), hx of Asthma and suspected baseline decreased cognitive disorder presented to the [facility] with persistent neurological symptoms that started a few hours before visit to the [place] ED (7/22). Patient reports his headache and blurry vision was started Friday (7/17), he was prescribed with HTN medication(unknown name) on Monday (7/20) and on Tuesday (7/21) he had nausea, vomiting x 3, dizziness for 2-3 hours, non specific diffuse abdominal discomfort, blurry vision, diffused dull headaches, and left side body weakness. He called 911 and he was transferred to [place] ED.  Patient denied chills, fever, cough or diaphoresis. Pt also endorsing tinnitus since Friday (5 days ago, at onset of symptoms)    Upon arrival to the [place] ED, patient was noted agitated with slurred speech and left side body weakness. CT w contrast significant for suspected obstruction of the posterior circulation and subsequent subacute bilateral cerebellar infarction. Following Neuro recommendation, ASA 325mg and Amlodipine 2.5 mg was given and BP goal was 150-180systolic ,  diastolic.         Patient was transferred to [facility] on 7/22/2020 for more evaluation. In [facility], 7/22/2020 he had significant + pt with significant dysmetria on Left finger-to-nose and Left heel-to-shin    and + dysarthria.                Problems, Plan:    1.CVA (cerebral vascular accident).      Patient presented to [place] [facility] with nausea, vomiting x 3, dizziness for 2-3 hours, non specific diffuse abdominal discomfort, blurry vision, diffused dull headaches, and left side body weakness.    Imaging in [place]:    -CT w contrast significant for suspected obstruction of the posterior circulation and subsequent subacute bilateral cerebellar infarction.    -CT Head W/O contrast: No evidence of acute intracranial hemorrhage. Concerning for BL subacute cerebral infarction.    ASA 325mg and Amlodipine 2.5 mg was given and BP goal was 150-180systolic ,  diastolic at outside hospital.    Meds at [facility]:    -Hydralazine 5mg IV push for SBP>180    -Continue aspirin 81mg     -Start Plavix 75 mg daily    - Lipitor 80 mg daily    - Passed bedside Dysphagia    - Continue neuro check q4h    - LISA done 7/23..................................        2.HTN (hypertension).     -Permissive HTN, Systolic 140-180    -Cr:1.55 (7/23),         3. R/O Acute kidney injury.      creatinine : 1.49, 1.55, However unknown baseline. Daily BMP monitored.        4. Asthma.     - Pt reports hx of asthma.     - Dounebs PRN for wheezing and SOB.                 Inpatient treatment course:             New medications:     Aspirin 81 mg,     Plavix 75 mg,    Amlodipine 5 mg (Creatinine: 1.37, 1.55)        Labs to be followed outpatient:     CMP(started statin on 7/22/2020)        Exam to be followed outpatient:     Neurologist follow up and Neurology exams. 60 Yo poor historian male with significant HTN (unknown med compliance), hx of Asthma and suspected baseline decreased cognitive disorder presented to the [facility] with persistent neurological symptoms that started a few hours before visit to the [place] [facility] (7/22). Patient reports his headache and blurry vision was started Friday (7/17), he was prescribed with HTN medication(unknown name) on Monday (7/20) and on Tuesday (7/21) he had nausea, vomiting x 3, dizziness for 2-3 hours, non specific diffuse abdominal discomfort, blurry vision, diffused dull headaches, and left side body weakness. He called 911 and he was transferred to [place] ED.  Patient denied chills, fever, cough or diaphoresis. Pt also endorsing tinnitus since Friday (5 days ago, at onset of symptoms)    Upon arrival to the [place] ED, patient was noted agitated with slurred speech and left side body weakness. CT w contrast significant for suspected obstruction of the posterior circulation and subsequent subacute bilateral cerebellar infarction. Following Neuro recommendation, ASA 325mg and Amlodipine 2.5 mg was given and BP goal was 150-180systolic ,  diastolic.         Patient was transferred to [facility] on 7/22/2020 for more evaluation. In [facility], 7/22/2020 he had significant + pt with significant dysmetria on Left finger-to-nose and Left heel-to-shin    and + dysarthria.                Problems, Plan:    1.CVA (cerebral vascular accident).      Patient presented to [place] [facility] with nausea, vomiting x 3, dizziness for 2-3 hours, non specific diffuse abdominal discomfort, blurry vision, diffused dull headaches, and left side body weakness.    Imaging in [place]:    -CT w contrast significant for suspected obstruction of the posterior circulation and subsequent subacute bilateral cerebellar infarction.    -CT Head W/O contrast: No evidence of acute intracranial hemorrhage. Concerning for BL subacute cerebral infarction.    ASA 325mg and Amlodipine 2.5 mg was given and BP goal was 150-180systolic ,  diastolic at outside hospital.    Meds at [facility]:    -Hydralazine 5mg IV push for SBP>180    -Continue aspirin 81mg     -Start Plavix 75 mg daily    - Lipitor 80 mg daily    - Passed bedside Dysphagia    - Continue neuro check q4h    - LISA done 7/23..................................        2.HTN (hypertension).     -Permissive HTN, Systolic 140-180    -Cr:1.55 (7/23),         3. R/O Acute kidney injury.      creatinine : 1.49, 1.55, However unknown baseline. Daily BMP monitored.        4. Asthma.     - Pt reports hx of asthma.     - Dounebs PRN for wheezing and SOB.                 Inpatient treatment course:     Patient was transferred to [facility] on 7/22/2020 for more evaluation after imaging showed . In [facility], 7/22/2020 he had significant + pt with significant dysmetria on         New medications:     Aspirin 81 mg daily    Plavix 75 mg daily    Amlodipine 5 mg daily        Labs to be followed outpatient:     CMP(started statin on 7/22/2020)        Exam to be followed outpatient:     Neurologist follow-up and Neurology exams. 60 Yo poor historian male with significant HTN (unknown med compliance), hx of Asthma and suspected baseline decreased cognitive disorder presented to the [facility] with persistent neurological symptoms that started a few hours before visit to the [place] ED (7/22). Patient reports his headache and blurry vision was started Friday (7/17), he was prescribed with HTN medication(unknown name) on Monday (7/20) and on Tuesday (7/21) he had nausea, vomiting x 3, dizziness for 2-3 hours, non specific diffuse abdominal discomfort, blurry vision, diffused dull headaches, and left side body weakness. He called 911 and he was transferred to [place] ED.  Patient denied chills, fever, cough or diaphoresis. Pt also endorsing tinnitus since Friday (5 days ago, at onset of symptoms)    Upon arrival to the [place] ED, patient was noted agitated with slurred speech and left side body weakness. CT w contrast significant for suspected obstruction of the posterior circulation and subsequent subacute bilateral cerebellar infarction. Following Neuro recommendation, ASA 325mg and Amlodipine 2.5 mg was given and BP goal was 150-180systolic ,  diastolic.         Patient was transferred to [facility] on 7/22/2020 for more evaluation. In [facility], 7/22/2020 he had significant + pt with significant dysmetria on Left finger-to-nose and Left heel-to-shin    and + dysarthria.                Problems, Plan:    #CVA     Patient presented to [place] [facility] with nausea, vomiting x 3, dizziness for 2-3 hours, non specific diffuse abdominal discomfort, blurry vision, diffused dull headaches, and left side body weakness.    Imaging at [place]:    -CT w contrast significant for suspected obstruction of the posterior circulation and subsequent subacute bilateral cerebellar infarction.    -CT Head W/O contrast: No evidence of acute intracranial hemorrhage. Concerning for BL subacute cerebral infarction.    ASA 325mg and Amlodipine 2.5 mg was given and BP goal was 150-180systolic ,  diastolic at outside hospital.    Meds at [facility]:    -Hydralazine 5mg IV push for SBP>180    -Continue aspirin 81mg     -Start Plavix 75 mg daily    - Lipitor 80 mg daily    - Passed bedside Dysphagia    - Continue neuro check q4h    - LISA cancelled due to HTN        #HTN (hypertension).     -Permissive HTN, Systolic 140-180    -Discharged on amlodipine 5 mg. Needs close f/u with Neurology or PCP        #Acute kidney injury.      creatinine : 1.49, 1.55, However unknown baseline. Daily BMP monitored.    - Needs close follow-up with PCP        #Asthma.     - Pt reports hx of asthma.     - Dounebs PRN for wheezing and SOB.                 Inpatient treatment course:     Patient was transferred to [facility] on 7/22/2020 for more evaluation after imaging showed bilateral cerebellar infarcts at [facility]. On arrival, he was started on aspirin, plavix, lipitor and plavix. He was  scheduled for a LISA; however, his blood pressure was too high to proceed. Patient was adamant about leaving against medical advice prior to procedure and a Code Grey was called due to combativeness. It was thoroughly explained at bedside to both patient and significant other that he was at high risk of having an additional stroke or acute MI with his blood pressure readings being over 200. He assured medical staff that he would comply with his antihypertensive, statin, plavix, and aspirin. He was encouraged to closely follow-up with his PCP and/or neurologist upon discharge.         New medications:     Aspirin 81 mg daily    Plavix 75 mg daily    Amlodipine 5 mg daily        Labs to be followed outpatient:     CMP(started statin on 7/22/2020)        Exam to be followed outpatient:     Neurologist follow-up and Neurology exams. 58 Yo poor historian male with significant HTN (unknown med compliance), hx of Asthma and suspected baseline decreased cognitive disorder presented to the [facility] with persistent neurological symptoms that started a few hours before visit to the [place] ED (7/22). Patient reports his headache and blurry vision was started Friday (7/17), he was prescribed with HTN medication(unknown name) on Monday (7/20) and on Tuesday (7/21) he had nausea, vomiting x 3, dizziness for 2-3 hours, non specific diffuse abdominal discomfort, blurry vision, diffused dull headaches, and left side body weakness. He called 911 and he was transferred to [place] ED.  Patient denied chills, fever, cough or diaphoresis. Pt also endorsing tinnitus since Friday (5 days ago, at onset of symptoms)    Upon arrival to the [place] ED, patient was noted agitated with slurred speech and left side body weakness. CT w contrast significant for suspected obstruction of the posterior circulation and subsequent subacute bilateral cerebellar infarction. Following Neuro recommendation, ASA 325mg and Amlodipine 2.5 mg was given and BP goal was 150-180systolic ,  diastolic.         Patient was transferred to [facility] on 7/22/2020 for more evaluation. In [facility], 7/22/2020 he had significant + pt with significant dysmetria on Left finger-to-nose and Left heel-to-shin    and + dysarthria.                Problems, Plan:    #CVA     Patient presented to [place] [facility] with nausea, vomiting x 3, dizziness for 2-3 hours, non specific diffuse abdominal discomfort, blurry vision, diffused dull headaches, and left side body weakness.    Imaging at [place]:    -CT w contrast significant for suspected obstruction of the posterior circulation and subsequent subacute bilateral cerebellar infarction.    -CT Head W/O contrast: No evidence of acute intracranial hemorrhage. Concerning for BL subacute cerebral infarction.    ASA 325mg and Amlodipine 2.5 mg was given and BP goal was 150-180systolic ,  diastolic at outside hospital.    Meds at [facility]:    -Hydralazine 5mg IV push for SBP>180    -Continue aspirin 81mg     -Start Plavix 75 mg daily    - Lipitor 80 mg daily    - Passed bedside Dysphagia    - Continue neuro check q4h    - LISA cancelled due to HTN        #HTN (hypertension).     -Permissive HTN, Systolic 140-180    -Discharged on amlodipine 5 mg. Needs close f/u with Neurology or PCP        #Acute kidney injury.      creatinine : 1.49, 1.55, However unknown baseline. Daily BMP monitored.    - Needs close follow-up with PCP        #Asthma.     - Pt reports hx of asthma.     - Dounebs PRN for wheezing and SOB.                 Inpatient treatment course:     Patient was transferred to [facility] on 7/22/2020 for more evaluation after imaging showed bilateral cerebellar infarcts at [facility]. On arrival, he was started on aspirin, plavix, lipitor and plavix. He was  scheduled for a LISA; however, his blood pressure was too high to proceed. Patient was adamant about leaving against medical advice prior to procedure and a Code Grey was called due to combativeness. It was thoroughly explained at bedside to both patient and significant other that he was at high risk of having an additional stroke or acute MI with his blood pressure readings being over 200. He assured medical staff that he would comply with his antihypertensive, statin, plavix, and aspirin. He was encouraged to closely follow-up with his PCP and/or neurologist upon discharge.         New medications:     Aspirin 81 mg daily    Plavix 75 mg daily    Amlodipine 5 mg daily        Labs to be followed outpatient:     CMP(started statin on 7/22/2020)        Exam to be followed outpatient:     Neurologist follow-up and Neurology exams.            --------------------------------    Attending addendum:        Patient seen today with stroke resident and PA team.  I was physically present for the key portions of the evaluation and management (E/M) service provided.  I agree with the above history, physical, and plan with the following additions/modifications         Patient with prominent but stable ataxia and dysarthria.  Stroke mechanism highly likely 2/2 intracranial atherosclerosis in posterior circulation.   Ambulated on my exama nd seen by PT, agree is stable for discharge to home. Continue DAPT for 3 weeks and then de-escalate to ASA only, and follow up with outpatient neurology.        Maciej Gaines MD    Attending Neurointensivist 58 Yo poor historian male with significant HTN (unknown med compliance), hx of Asthma and suspected baseline decreased cognitive disorder presented to the [facility] with persistent neurological symptoms that started a few hours before visit to the [place] ED (7/22). Patient reports his headache and blurry vision was started Friday (7/17), he was prescribed with HTN medication(unknown name) on Monday (7/20) and on Tuesday (7/21) he had nausea, vomiting x 3, dizziness for 2-3 hours, non specific diffuse abdominal discomfort, blurry vision, diffused dull headaches, and left side body weakness. He called 911 and he was transferred to [place] ED.  Patient denied chills, fever, cough or diaphoresis. Pt also endorsing tinnitus since Friday (5 days ago, at onset of symptoms)    Upon arrival to the [place] ED, patient was noted agitated with slurred speech and left side body weakness. CT w contrast significant for suspected obstruction of the posterior circulation and subsequent subacute bilateral cerebellar infarction. Following Neuro recommendation, ASA 325mg and Amlodipine 2.5 mg was given and BP goal was 150-180systolic ,  diastolic.         Patient was transferred to [facility] on 7/22/2020 for more evaluation. In [facility], 7/22/2020 he had significant + pt with significant dysmetria on Left finger-to-nose and Left heel-to-shin    and + dysarthria.                Problems, Plan:    #CVA     Patient presented to [place] [facility] with nausea, vomiting x 3, dizziness for 2-3 hours, non specific diffuse abdominal discomfort, blurry vision, diffused dull headaches, and left side body weakness.    Imaging at [place]:    -CT w contrast significant for suspected obstruction of the posterior circulation and subsequent subacute bilateral cerebellar infarction.    -CT Head W/O contrast: No evidence of acute intracranial hemorrhage. Concerning for BL subacute cerebral infarction.    ASA 325mg and Amlodipine 2.5 mg was given and BP goal was 150-180systolic ,  diastolic at outside hospital.    Meds at [facility]:    -Hydralazine 5mg IV push for SBP>180    -Continue aspirin 81mg     -Start Plavix 75 mg daily    - Lipitor 80 mg daily    - Passed bedside Dysphagia    - Continue neuro check q4h    - LISA cancelled due to HTN        #HTN (hypertension).     -Permissive HTN, Systolic 140-180    -Discharged on amlodipine 5 mg. Needs close f/u with Neurology or PCP        #Acute kidney injury.      creatinine : 1.49, 1.55, However unknown baseline. Daily BMP monitored.    - Needs close follow-up with PCP        #Asthma.     - Pt reports hx of asthma.     - Dounebs PRN for wheezing and SOB.                 Inpatient treatment course:     Patient was transferred to [facility] on 7/22/2020 for more evaluation after imaging showed bilateral cerebellar infarcts at [facility]. On arrival, he was started on aspirin, plavix, lipitor and plavix. He was  scheduled for a LISA; however, his blood pressure was too high to proceed. Patient was adamant about leaving against medical advice prior to procedure and a Code Grey was called due to combativeness. It was thoroughly explained at bedside to both patient and significant other that he was at high risk of having an additional stroke or acute MI with his blood pressure readings being over 200. He assured medical staff that he would comply with his antihypertensive, statin, plavix, and aspirin. He was encouraged to closely follow-up with his PCP and/or neurologist upon discharge.         New medications:     Aspirin 81 mg daily    Plavix 75 mg daily    Amlodipine 5 mg daily        Labs to be followed outpatient:     CMP(started statin on 7/22/2020)        Exam to be followed outpatient:     Neurologist follow-up and Neurology exams.

## 2020-07-23 NOTE — PROGRESS NOTE ADULT - SUBJECTIVE AND OBJECTIVE BOX
INTERVAL HPI/OVERNIGHT EVENTS:   ..................................    SUBJECTIVE: Patient seen and examined at bedside. ...............................????    OBJECTIVE:    VITAL SIGNS:  ICU Vital Signs Last 24 Hrs  T(C): 36.5 (2020 05:36), Max: 36.8 (2020 13:21)  T(F): 97.7 (2020 05:36), Max: 98.2 (2020 13:21)  HR: 58 (2020 05:00) (49 - 76)  BP: 164/91 (2020 05:00) (135/85 - 224/106)  BP(mean): 122 (2020 05:00) (105 - 152)  ABP: --  ABP(mean): --  RR: 16 (2020 05:00) (16 - 26)  SpO2: 100% (2020 05:00) (97% - 100%)        07-22 @ 07:01  -  07-23 @ 06:48  --------------------------------------------------------  IN: 1000 mL / OUT: 1300 mL / NET: -300 mL      CAPILLARY BLOOD GLUCOSE          PHYSICAL EXAM:    General: WDWN ; NAD  HEENT: NC/AT; PERRL, anicteric sclera  Neck: supple, no JVD  Respiratory: CTA B/L; no W/R/R  Cardiovascular: +S1/S2; RRR; no M/R/G  Gastrointestinal: soft, NT/ND; +BS x4  Extremities: WWP; 2+ peripheral pulses B/L; no LE edema  Skin: normal color and turgor; no rash  Neurological:     MEDICATIONS:  MEDICATIONS  (STANDING):  aspirin  chewable 81 milliGRAM(s) Oral daily  atorvastatin 80 milliGRAM(s) Oral at bedtime  clopidogrel Tablet 75 milliGRAM(s) Oral daily  enoxaparin Injectable 40 milliGRAM(s) SubCutaneous at bedtime    MEDICATIONS  (PRN):  hydrALAZINE Injectable 5 milliGRAM(s) IV Push every 4 hours PRN BP > 180      ALLERGIES:  Allergies    No Known Allergies    Intolerances        LABS:                        12.7   9.75  )-----------( 292      ( 2020 18:13 )             41.1     -    141  |  105  |  14  ----------------------------<  100<H>  4.4   |  25  |  1.37<H>    Ca    9.3      2020 02:40  Phos  2.8     07-  Mg     2.3     -    TPro  7.6  /  Alb  4.5  /  TBili  0.4  /  DBili  x   /  AST  20  /  ALT  13  /  AlkPhos  62  07-    LIVER FUNCTIONS - ( 2020 18:12 )  Alb: 4.5 g/dL / Pro: 7.6 g/dL / ALK PHOS: 62 U/L / ALT: 13 U/L / AST: 20 U/L / GGT: x             Urinalysis Basic - ( 2020 23:16 )    Color: Yellow / Appearance: Clear / S.015 / pH: x  Gluc: x / Ketone: NEGATIVE  / Bili: Negative / Urobili: 0.2 E.U./dL   Blood: x / Protein: NEGATIVE mg/dL / Nitrite: NEGATIVE   Leuk Esterase: NEGATIVE / RBC: x / WBC x   Sq Epi: x / Non Sq Epi: x / Bacteria: x            RADIOLOGY & ADDITIONAL TESTS: Reviewed. INTERVAL HPI/OVERNIGHT EVENTS:   ..................................    SUBJECTIVE: Patient seen and examined at bedside. ...............................????    OBJECTIVE:    VITAL SIGNS:  ICU Vital Signs Last 24 Hrs  T(C): 36.5 (2020 05:36), Max: 36.8 (2020 13:21)  T(F): 97.7 (2020 05:36), Max: 98.2 (2020 13:21)  HR: 58 (2020 05:00) (49 - 76)  BP: 164/91 (2020 05:00) (135/85 - 224/106)  BP(mean): 122 (2020 05:00) (105 - 152)  ABP: --  ABP(mean): --  RR: 16 (2020 05:00) (16 - 26)  SpO2: 100% (2020 05:00) (97% - 100%)        07-22 @ 07:01  -  07-23 @ 06:48  --------------------------------------------------------  IN: 1000 mL / OUT: 1300 mL / NET: -300 mL      CAPILLARY BLOOD GLUCOSE          PHYSICAL EXAM: ????????????    Constitutional: WDWN; NAD  Cardiovascular: RRR  Neurologic:  -Mental status: Awake, alert and oriented to person, place, and time. + dysarthria. Speech is fluent with intact naming, repetition.  Recent memory intact.  Attention/concentration intact.   -Cranial nerves:  II: Visual fields full to confrontation. Pupils PERRL  III, IV, VI: extraocular movements are intact, minimal horizontal nystagmus  V: Facial sensation intact V1 through V3 intact bilaterally  VII: Face is symmetric with normal eye closure and smile, no facial droop.  VIII: Pt hears more on L side then R  -Motor:  Normal bulk and tone, 5/5 in bilateral upper and lower extremities.   -Sensation: Intact to light touch x 4.  No neglect.   -Coordination: + pt with significant dysmetria on Left finger-to-nose and Left heel-to-shin. finger to nose abnormal on R as well  -Reflexes: 2+ in upper and lower extremities    NIHSS: 3  ICH: n/a    Fingerstick Blood Glucose: CAPILLARY BLOOD GLUCOSE        MEDICATIONS:  MEDICATIONS  (STANDING):  aspirin  chewable 81 milliGRAM(s) Oral daily  atorvastatin 80 milliGRAM(s) Oral at bedtime  clopidogrel Tablet 75 milliGRAM(s) Oral daily  enoxaparin Injectable 40 milliGRAM(s) SubCutaneous at bedtime    MEDICATIONS  (PRN):  hydrALAZINE Injectable 5 milliGRAM(s) IV Push every 4 hours PRN BP > 180      ALLERGIES:  Allergies    No Known Allergies    Intolerances        LABS:                        12.7   9.75  )-----------( 292      ( 2020 18:13 )             41.1     07-23    141  |  105  |  14  ----------------------------<  100<H>  4.4   |  25  |  1.37<H>    Ca    9.3      2020 02:40  Phos  2.8     07-22  Mg     2.3     07-22    TPro  7.6  /  Alb  4.5  /  TBili  0.4  /  DBili  x   /  AST  20  /  ALT  13  /  AlkPhos  62  07-22    LIVER FUNCTIONS - ( 2020 18:12 )  Alb: 4.5 g/dL / Pro: 7.6 g/dL / ALK PHOS: 62 U/L / ALT: 13 U/L / AST: 20 U/L / GGT: x             Urinalysis Basic - ( 2020 23:16 )    Color: Yellow / Appearance: Clear / S.015 / pH: x  Gluc: x / Ketone: NEGATIVE  / Bili: Negative / Urobili: 0.2 E.U./dL   Blood: x / Protein: NEGATIVE mg/dL / Nitrite: NEGATIVE   Leuk Esterase: NEGATIVE / RBC: x / WBC x   Sq Epi: x / Non Sq Epi: x / Bacteria: x            RADIOLOGY & ADDITIONAL TESTS: Reviewed.

## 2020-07-23 NOTE — DISCHARGE NOTE NURSING/CASE MANAGEMENT/SOCIAL WORK - NSDCPEPTSTRK_GEN_ALL_CORE
Need for follow up after discharge/Risk factors for stroke/Stroke education booklet/Stroke warning signs and symptoms/Call 911 for stroke/Stroke support groups for patients, families, and friends/Prescribed medications/Signs and symptoms of stroke

## 2020-07-23 NOTE — PROGRESS NOTE ADULT - PROBLEM SELECTOR PLAN 1
Patient presented to [facility] with nausea, vomiting x 3, dizziness for 2-3 hours, non specific diffuse abdominal discomfort, blurry vision, diffused dull headaches, and left side body weakness.  Imaging in [place]:  -CT w contrast significant for suspected obstruction of the posterior circulation and subsequent subacute bilateral cerebellar infarction.  -CT Head W/O contrast: No evidence of acute intracranial hemorrhage. Concerning for BL subacute cerebral infarction.  ASA 325mg and Amlodipine 2.5 mg was given and BP goal was 150-180systolic ,  diastolic at outside hospital.  -Hydralazine 5mg IV push for SBP>180  -Continue aspirin 81mg   -Start Plavix 75 mg daily  - Lipitor 80 mg daily  - Passed bedside Dysphagia  - Continue neuro check q4h  - f/u LISA result for 7/23  - No need for more imaging

## 2020-07-23 NOTE — DISCHARGE NOTE PROVIDER - PROVIDER TOKENS
FREE:[LAST:[Shwetha],FIRST:[Dane],PHONE:[phone]],FAX:[(   )    -],ADDRESS:[[address] [place] [address]],FOLLOWUP:[1-3 days]]
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
## 2020-07-23 NOTE — DISCHARGE NOTE PROVIDER - NSDCFUADDAPPT_GEN_ALL_CORE_FT
Dr. Dane Tadeo Neurologist in [place]    [phone] Please schedule an appointment with Dr. Dane Tadeo or any Neurologist at [facility] upon discharge. It is imperative if you close follow up for your extremely high blood pressure and recent stroke.     [phone]

## 2020-07-23 NOTE — DISCHARGE NOTE NURSING/CASE MANAGEMENT/SOCIAL WORK - PATIENT PORTAL LINK FT
You can access the FollowMyHealth Patient Portal offered by [facility] by registering at the following website: [email or web address]. By joining [email or web address]’s FollowMyHealth portal, you will also be able to view your health information using other applications (apps) compatible with our system.

## 2020-07-23 NOTE — CONSULT NOTE ADULT - ASSESSMENT
59 year old male with,    1. Cerebellar stroke: improved. Ok for discharge per Stroke team. C/w ASA/plavix and statin. Unable to perform LISA as BP was very high. PT --> no needs.    2. Uncontrolled HTN: c/w amlodipine. Encourage compliance. Needs PMD referral near his home.    3. Asthma: nebs prn.    Thanks for the consult. Discussed with Stroke team.

## 2020-07-23 NOTE — DISCHARGE NOTE PROVIDER - CARE PROVIDER_API CALL
Dane Tadeo  [address] [place] [address]  Phone: [phone]  Fax: (   )    -  Follow Up Time: 1-3 days

## 2020-07-28 DIAGNOSIS — R29.703 NIHSS SCORE 3: ICD-10-CM

## 2020-07-28 DIAGNOSIS — N17.9 ACUTE KIDNEY FAILURE, UNSPECIFIED: ICD-10-CM

## 2020-07-28 DIAGNOSIS — I10 ESSENTIAL (PRIMARY) HYPERTENSION: ICD-10-CM

## 2020-07-28 DIAGNOSIS — H53.8 OTHER VISUAL DISTURBANCES: ICD-10-CM

## 2020-07-28 DIAGNOSIS — R47.1 DYSARTHRIA AND ANARTHRIA: ICD-10-CM

## 2020-07-28 DIAGNOSIS — Z11.59 ENCOUNTER FOR SCREENING FOR OTHER VIRAL DISEASES: ICD-10-CM

## 2020-07-28 DIAGNOSIS — R27.8 OTHER LACK OF COORDINATION: ICD-10-CM

## 2020-07-28 DIAGNOSIS — F09 UNSPECIFIED MENTAL DISORDER DUE TO KNOWN PHYSIOLOGICAL CONDITION: ICD-10-CM

## 2020-07-28 DIAGNOSIS — J45.909 UNSPECIFIED ASTHMA, UNCOMPLICATED: ICD-10-CM

## 2020-07-28 DIAGNOSIS — H93.19 TINNITUS, UNSPECIFIED EAR: ICD-10-CM

## 2020-07-28 DIAGNOSIS — I63.9 CEREBRAL INFARCTION, UNSPECIFIED: ICD-10-CM

## 2020-07-28 DIAGNOSIS — G81.94 HEMIPLEGIA, UNSPECIFIED AFFECTING LEFT NONDOMINANT SIDE: ICD-10-CM

## 2020-07-28 DIAGNOSIS — Z53.09 PROCEDURE AND TREATMENT NOT CARRIED OUT BECAUSE OF OTHER CONTRAINDICATION: ICD-10-CM

## 2021-10-16 ASSESSMENT — KERATOMETRY
OD_AXISANGLE2_DEGREES: 90
OD_AXISANGLE2_DEGREES: 90
OS_AXISANGLE2_DEGREES: 136
OD_K1POWER_DIOPTERS: 43
OD_K1POWER_DIOPTERS: 43
OS_AXISANGLE_DEGREES: 10
OD_K2POWER_DIOPTERS: 42.75
OD_K2POWER_DIOPTERS: 43.25
OS_K2POWER_DIOPTERS: 40.5
OD_K1POWER_DIOPTERS: 42.75
OD_AXISANGLE_DEGREES: 8
OS_K1POWER_DIOPTERS: 40.25
OD_K2POWER_DIOPTERS: 43
OS_AXISANGLE_DEGREES: 5
OS_K1POWER_DIOPTERS: 40
OS_AXISANGLE2_DEGREES: 100
OS_AXISANGLE2_DEGREES: 95
OS_K2POWER_DIOPTERS: 40.5
OS_K1POWER_DIOPTERS: 42.25
OS_AXISANGLE_DEGREES: 46
OD_AXISANGLE_DEGREES: 180
OD_AXISANGLE_DEGREES: 180
OD_AXISANGLE2_DEGREES: 98
OS_K2POWER_DIOPTERS: 42.5

## 2021-10-16 ASSESSMENT — VISUAL ACUITY
OS_PH: 20/30 SN
OD_SC: 20/20 SN
OS_SC: 20/70 SN
OD_SC: 20/25 SN
OS_SC: 20/40 SN
OS_SC: 20/40 SN
OD_SC: 20/20 -2 SN
OS_SC: 20/25 SN

## 2021-10-16 ASSESSMENT — PACHYMETRY
OS_CT_UM: 497.0
OD_CT_UM: 542.0

## 2021-10-16 ASSESSMENT — TONOMETRY
OS_IOP_MMHG: 12
OD_IOP_MMHG: 15